# Patient Record
(demographics unavailable — no encounter records)

---

## 2025-01-14 NOTE — PAST MEDICAL HISTORY
[Menstruating] : hx of menstruating [Menarche Age ____] : age at menarche was [unfilled] [Regular Cycle Intervals] : have been regular [Total Preg ___] : G: [unfilled]

## 2025-01-17 NOTE — HISTORY OF PRESENT ILLNESS
[Parent] : parent [FreeTextEntry8] : CC follow-up for hospitalization for leukopenia and neutropenia and low platelet  HPI the patient is a healthy 19-year-old female with history of possible peptic ulcer disease, headache/possible migraines, underweight who had some routine blood test by pediatrics noted to have a white count of 1.5/borderline neutropenic was referred to [facility] nasal swabs was positive for influenza A patient was admitted for 4 days evaluated by hematology oncology flow cytometry was negative patient was subsequent discharged now presents for follow-up patient feels well complains of fatigue but denies fever chills shortness of breath cough runny nose muscle aches also noted in sonogram of the hospitalization showed gallbladder polyps echo showed mild pulmonary hypertension recent labs also showed suppressed TSH of 0.4

## 2025-01-17 NOTE — HEALTH RISK ASSESSMENT
[Yes] : Yes [2 - 4 times a month (2 pts)] : 2-4 times a month (2 points) [1 or 2 (0 pts)] : 1 or 2 (0 points) [Never (0 pts)] : Never (0 points) [No falls in past year] : Patient reported no falls in the past year [0] : 2) Feeling down, depressed, or hopeless: Not at all (0) [PHQ-2 Negative - No further assessment needed] : PHQ-2 Negative - No further assessment needed [Never] : Never [Audit-CScore] : 2 [[id number]] : 0

## 2025-01-17 NOTE — ASSESSMENT
[FreeTextEntry1] : The patient is a healthy 19-year-old female with history of recent hospitalization for leukopenia who presents for follow-up  1.  Leukopenia patient absolute neutrophils low as 500 flow cytometry negative most likely postviral will observe follow-up blood test in 1 to 2 weeks leaving for [place] already given go to local lab  2.  Anxiety Patient on distress suggested mental health at school goes to [facility] Observe  3.  Underweight Patient claims she has no eating disorder wants to gain weight but does not have the biggest appetite had borderline thyroids will repeat Consider referral to GI for IBS  4.  Health maintenance Counseled on safe sex practices Immunizations up-to-date Follow-up as needed  5.  Thrombocytopenia Recheck platelet count postviral  7 gallbladder polyp Will follow-up sonogram in 1 year  8 history of mild pulmonary hypertension on echo Probable overweight will observe asymptomatic  9. Positive influenza respiratory panel Status post Tamiflu doing well no fevers chills muscle aches.  \10 headaches Sounds like tension headaches versus migraine will observe doing well current lifestyle modifications

## 2025-01-17 NOTE — REVIEW OF SYSTEMS
[Abdominal Pain] : abdominal pain [Nausea] : nausea [Headache] : headache [Anxiety] : anxiety [Negative] : Heme/Lymph [Constipation] : no constipation [Diarrhea] : diarrhea [Vomiting] : no vomiting [Heartburn] : no heartburn [Melena] : no melena [Dizziness] : no dizziness [Fainting] : no fainting [Confusion] : no confusion [Memory Loss] : no memory loss [Unsteady Walking] : no ataxia [Suicidal] : not suicidal [Insomnia] : no insomnia [Depression] : no depression

## 2025-01-17 NOTE — ASSESSMENT
[FreeTextEntry1] : The patient is a healthy 19-year-old female with history of recent hospitalization for leukopenia who presents for follow-up  1.  Leukopenia patient absolute neutrophils low as 500 flow cytometry negative most likely postviral will observe follow-up blood test in 1 to 2 weeks leaving for [place] already given go to local lab  2.  Anxiety Patient on distress suggested mental health at school goes to [facility] Observe  3.  Underweight Patient claims she has no eating disorder wants to gain weight but does not have the biggest appetite had borderline thyroids will repeat Consider referral to GI for IBS  4.  Health maintenance Counseled on safe sex practices Immunizations up-to-date Follow-up as needed  5.  Thrombocytopenia Recheck platelet count postviral  7 gallbladder polyp Will follow-up sonogram in 1 year  8 history of mild pulmonary hypertension on echo Probable overweight will observe asymptomatic  9. Positive influenza respiratory panel Status post Tamiflu doing well no fevers chills muscle aches.  \10 headaches Sounds like tension headaches versus migraine will observe doing well current lifestyle modifications

## 2025-01-17 NOTE — HEALTH RISK ASSESSMENT
[Yes] : Yes [2 - 4 times a month (2 pts)] : 2-4 times a month (2 points) [1 or 2 (0 pts)] : 1 or 2 (0 points) [Never (0 pts)] : Never (0 points) [No falls in past year] : Patient reported no falls in the past year [0] : 2) Feeling down, depressed, or hopeless: Not at all (0) [PHQ-2 Negative - No further assessment needed] : PHQ-2 Negative - No further assessment needed [Never] : Never [Audit-CScore] : 2 [[id number]] : 0

## 2025-01-17 NOTE — HISTORY OF PRESENT ILLNESS
[Parent] : parent [FreeTextEntry8] : CC follow-up for hospitalization for leukopenia and neutropenia and low platelet  HPI the patient is a healthy 19-year-old female with history of possible peptic ulcer disease, headache/possible migraines, underweight who had some routine blood test by pediatrics noted to have a white count of 1.5/borderline neutropenic was referred to [facility] nasal swabs was positive for influenza A patient was admitted for 4 days evaluated by hematology oncology flow cytometry was negative patient was subsequent discharged now presents for follow-up patient feels well complains of fatigue but denies fever chills shortness of breath cough runny nose muscle aches also noted in sonogram of the hospitalization showed gallbladder polyps echo showed mild pulmonary hypertension recent labs also showed suppressed TSH of 0.4

## 2025-06-11 NOTE — HISTORY OF PRESENT ILLNESS
[de-identified] : Ms. KAI VALENZUELA is a 20-year-old female who presents for:    1) Bumps, arms, legs - Duration: 2.5 weeks but seem to have mostly resolved - Symptoms: minimally itchy - Prior tx: none   2) Acne, face - Duration: years - Symptoms: painful sometimes when present on chin and jawline - Prior tx: BP, clindamycin lotion, tretinoin, spironolactone  - Reports menstrual flares  Derm Hx: none; no personal hx of skin cancer Family Hx: no family hx of skin cancer Social Hx: college student [FreeTextEntry1] : NPV bumps, acne

## 2025-06-11 NOTE — ASSESSMENT
[FreeTextEntry1] : #Dermatitis, extremities Ddx arthropod bites, nummular dermatitis  - Resolving on exam - Continue to monitor for resolution - offered triamcinolone though pt declines at this time given nearly resolved - RTC for any flare  #Acne vulgaris w/#PIH, face, mild to moderate, comedonal and hormonal, chronic, flaring - We have discussed the nature and course of this condition - START Spironolactone 100 mg qHS - start with 50 mg qHS for first week  - We have discussed possible adverse effects of the medication including headache, lightheadedness, dizziness, low blood pressure, breast tenderness, menstrual irregularities, and increased potassium levels. Pt has also been instructed to avoid a high potassium diet. - We have discussed that pregnancy may result in fetal abnormalities while on this medication and 3 months after completing course, and that patient must take adequate measures to eliminate risk of conceiving or participation in conceiving while on this medication. Patient expressed understanding of above risks. - Disc tretinoin but pt declines at this point as she is vey concerned about purging which has happened before  RTC 3-4 months

## 2025-06-11 NOTE — HISTORY OF PRESENT ILLNESS
[de-identified] : Ms. KAI VALENZUELA is a 20-year-old female who presents for:    1) Bumps, arms, legs - Duration: 2.5 weeks but seem to have mostly resolved - Symptoms: minimally itchy - Prior tx: none   2) Acne, face - Duration: years - Symptoms: painful sometimes when present on chin and jawline - Prior tx: BP, clindamycin lotion, tretinoin, spironolactone  - Reports menstrual flares  Derm Hx: none; no personal hx of skin cancer Family Hx: no family hx of skin cancer Social Hx: college student [FreeTextEntry1] : NPV bumps, acne

## 2025-06-11 NOTE — PHYSICAL EXAM
[Alert] : alert [Oriented x 3] : ~L oriented x 3 [Declined] : declined [FreeTextEntry3] : Focused exam only (see below) per patient request: - Erythematous papules and hyperpigmented macules on cheeks and jawline  - Closed comedones on forehead  - Few skin-colored minimally scaly papules on L forearm